# Patient Record
Sex: MALE | Race: WHITE
[De-identification: names, ages, dates, MRNs, and addresses within clinical notes are randomized per-mention and may not be internally consistent; named-entity substitution may affect disease eponyms.]

---

## 2017-08-30 ENCOUNTER — HOSPITAL ENCOUNTER (EMERGENCY)
Dept: HOSPITAL 74 - FER | Age: 22
Discharge: HOME | End: 2017-08-30
Payer: COMMERCIAL

## 2017-08-30 VITALS — TEMPERATURE: 98.5 F | HEART RATE: 76 BPM | DIASTOLIC BLOOD PRESSURE: 80 MMHG | SYSTOLIC BLOOD PRESSURE: 126 MMHG

## 2017-08-30 VITALS — BODY MASS INDEX: 29.8 KG/M2
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
## 2017-08-30 DIAGNOSIS — S63.287A: Primary | ICD-10-CM
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
## 2017-08-30 DIAGNOSIS — J45.909: ICD-10-CM

## 2017-08-30 PROCEDURE — 2W3KX1Z IMMOBILIZATION OF LEFT FINGER USING SPLINT: ICD-10-PCS

## 2017-08-30 NOTE — PDOC
History of Present Illness





- General


History Source: Patient


Exam Limitations: No Limitations





- History of Present Illness


Initial Comments: 


08/30/17 19:24


Patient is a 22 year old male with no pmhx who presents to the ED with left 

pinky finger injury. Patient states that he was playing basketball as another 

player his his left hand. He reports left pinky finger pain and difficulty 

bending the digit. 


He denies any head trauma, fall or LOC. 





PAST MEDICAL HISTORY:  no significant history


PAST SURGICAL HISTORY:  no significant history


FAMILY HISTORY:  no pertinent history


SOCIAL HISTORY:  Pt lives with  family and is employed.


MEDICATIONS:  reviewed


ALLERGIES:  As per nursing notes





General:  No fevers or chills, no weakness, no weight loss 


HEENT: No change in vision.  No sore throat, No ear pain


CardioVascular:  No chest pain or shortness of breath


Respiratory:No cough, or wheezing. 


Gastrointestinal:  no nausea, vomiting, diarrhea or constipation,  No rectal 

bleeding


Genitourinary:  No dysuria, hematuria, or frequency


Musculoskeletal:  +left pinky finger pain. No muscle pain or swelling


Neurologic: No headache, vertigo, dizziness or loss of consciousness


Psychiatric: nor depression 


Skin: No rashes or easy bruising


Endocrine: no increased thirst or abnormal weight change


Allergic: no skin or latex allergy


All other systems reviewed and normal





GENERAL: The patient is awake, alert, and fully 


oriented, in no acute distress.


HEAD: Normal with no signs of trauma.


EYES: Pupils equal, round and reactive to light, extraocular movements intact, 

sclera anicteric, conjunctiva clear.


EXTREMITIES:  (+)tenderness and swelling over the left pinky PIP joint with 

questionable deformity, patient is unable to flex the PIP joint secondary to 

pain and possible deformity, +Neurovascularly intact.  


NEUROLOGICAL: Normal speech, normal gait.


PSYCH: Normal mood, normal affect.


SKIN: Warm, Dry, normal turgor, no rashes or lesions noted.








<Analia Thomas - Last Filed: 08/30/17 19:24>





- General


History Source: Patient


Exam Limitations: No Limitations





- History of Present Illness


Initial Comments: 





08/30/17 20:03





A portion of this note was documented by scribe services under my direction. I 

have reviewed the details of the note, within reason, and agree with the 

documentation.  The case summary and management plan written by me. 





X-rays dislocation of PIP joint fifth finger





Procedure note: Manual reduction dislocated PIP joint fifth finger left hand


Finger was anesthetized via digital ring block using 1% lidocaine no 

epinephrine total cc approximately 4 mL


Finger was relocated without difficulty


Post reduction film shows good reduction of the dislocation





Assessment and plan: This is a 22-year-old male comes in with injury to his 

left fifth finger status post injuring it while playing a basketball game. 

Patient had a dislocation of the PIP joint that was reduced manually post 

digital ring block.





Splint was applied and patient was discharged. Patient was given referral to 

[facility] for follow-up.





<Roderick Shaw - Last Filed: 08/30/17 20:08>





- General


Chief Complaint: Injury


Stated Complaint: LEFT PINKY INJURY


Time Seen by Provider: 08/30/17 19:20





Past History





<Analia Thomas - Last Filed: 08/30/17 19:24>





- Past Medical History


Asthma: Yes





- Psycho/Social/Smoking Cessation Hx


Anxiety: No


Suicidal Ideation: No


Smoking History: Never smoked


Have you smoked in the past 12 months: No


Hx Alcohol Use: No


Drug/Substance Use Hx: No


Substance Use Type: None





<Roderick Shaw - Last Filed: 08/30/17 20:08>





- Past Medical History


Allergies/Adverse Reactions: 


 Allergies











Allergy/AdvReac Type Severity Reaction Status Date / Time


 


No Known Allergies Allergy   Verified 08/30/17 19:19











Home Medications: 


Ambulatory Orders





NK [No Known Home Medication]  08/30/17 











*DC/Admit/Observation/Transfer





- Attestations


Scribe Attestion: 





08/30/17 19:25





Documentation prepared by JORDAN Estes, acting as medical scribe for 

Roderick Shaw MD.





<Analia Thomas - Last Filed: 08/30/17 19:24>





- Discharge Dispostion


Admit: No





<Roderick Shaw - Last Filed: 08/30/17 20:08>


Diagnosis at time of Disposition: 


Dislocation of proximal interphalangeal joint of left little finger


Qualifiers:


 Encounter type: initial encounter Qualified Code(s): S63.287A - Dislocation of 

proximal interphalangeal joint of left little finger, initial encounter





- Discharge Dispostion


Disposition: HOME


Condition at time of disposition: Stable





- Patient Instructions


Additional Instructions: 


Tylenol or Motrin as needed for pain.





Wear the splint until you are seen by an orthopedist. Make sure you keep the 

splint dry if he get it wet taken off dry with a hairdryer and put it back on.





If you need an orthopedist call Dr. Casanova at [phone] in the morning for an 

appointment.





Return to the emergency department immediately with ANY new, persistent or 

worsening symptoms.





Continue any medications as previously prescribed by your physician.





You should follow up with your primary doctor as soon as possible regarding 

today's emergency department visit.


.


Please make sure your doctor reviews the results of your emergency evaluation.





Thank you for coming to the   Emergency Department today for your care. It was 

a pleasure to see you today. Please note that your evaluation is INCOMPLETE 

until you  follow-up with your doctor.

## 2018-05-30 ENCOUNTER — HOSPITAL ENCOUNTER (EMERGENCY)
Dept: HOSPITAL 74 - FER | Age: 23
Discharge: HOME | End: 2018-05-30
Payer: SELF-PAY

## 2018-05-30 VITALS — SYSTOLIC BLOOD PRESSURE: 125 MMHG | TEMPERATURE: 98.4 F | HEART RATE: 62 BPM | DIASTOLIC BLOOD PRESSURE: 76 MMHG

## 2018-05-30 VITALS — BODY MASS INDEX: 29.1 KG/M2
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
## 2018-05-30 DIAGNOSIS — S51.011A: Primary | ICD-10-CM
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
## 2018-05-30 PROCEDURE — 0HQDXZZ REPAIR RIGHT LOWER ARM SKIN, EXTERNAL APPROACH: ICD-10-PCS

## 2018-05-30 NOTE — PDOC
History of Present Illness





- General


Chief Complaint: Injury


Stated Complaint: HIT RIGHT ELBOW ON TOWEL RACK


Time Seen by Provider: 05/30/18 01:14


History Source: Patient


Exam Limitations: No Limitations





- History of Present Illness


Initial Comments: 





05/30/18 01:16


This is a 19-year-old male who comes in complaining of a laceration to his 

right elbow. Last immunization was 8 years ago. Patient is otherwise healthy.





PAST MEDICAL HISTORY:  no significant history





PAST SURGICAL HISTORY:  no significant history





FAMILY HISTORY:  no pertinant history





SOCIAL HISTORY:  Pt lives with  family and is employed.





MEDICATIONS:  reviewed





ALLERGIES:  As per nursing notes





Review of Systems


General:  No fevers or chills, no weakness, no weight loss 


HEENT: No change in vision.  No sore throat,. No ear pain


CardioVascular:  No chest pain or shortness of breath


Respiratory:No cough, or wheezing. 


Gastrointestinal:  no nausea, vomitting, diarrhea or constipation,  No rectal 

bleeding


Genitourinary:  No dysuria, hematuria, or frequency


Musculoskeletal:  No joint or muscle pain or swelling


Neurologic: No headache, vertigo, dizziness or loss of consciousness


Psychiatric: nor depression 


Skin: No rashes or easy bruising


Endocrine: no increased thirst or abnormal weight change


Allergic: no skin or latex allergy


All other systems reviewed and normal








GENERAL: The patient is awake, alert, and fully oriented, in no acute distress.


HEAD: Normal with no signs of trauma.


EYES: Pupils equal, round and reactive to light, extraocular movements intact, 

sclera anicteric, conjunctiva clear.


EXTREMITIES: There is approximately 3 mm superficial laceration over the right 

elbow posteriorly neurovascular is intact


NEUROLOGICAL: Normal speech, normal gait. grossly intact 


PSYCH: Normal mood, normal affect.


SKIN: Warm, Dry, normal turgor, no rashes or lesions noted.





Procedure note Dermabond repair of laceration


Laceration cleaned and then closed with Dermabond patient tolerated well 





Past History





- Past Medical History


Allergies/Adverse Reactions: 


 Allergies











Allergy/AdvReac Type Severity Reaction Status Date / Time


 


No Known Allergies Allergy   Verified 05/30/18 01:09











Home Medications: 


Ambulatory Orders





Albuterol Sulfate Inhaler - [Ventolin Hfa Inhaler -] 1 - 2 inh PO QID 05/30/18 








Asthma: Yes


COPD: No





- Suicide/Smoking/Psychosocial Hx


Smoking History: Never smoked


Have you smoked in the past 12 months: No


Information on smoking cessation initiated: No


Hx Alcohol Use: No


Drug/Substance Use Hx: No


Substance Use Type: None





*Physical Exam





- Vital Signs


 Last Vital Signs











Temp Pulse Resp BP Pulse Ox


 


 98.4 F   62   17   125/76   100 


 


 05/30/18 01:09  05/30/18 01:09 05/30/18 01:09 05/30/18 01:09 05/30/18 01:09














*DC/Admit/Observation/Transfer


Diagnosis at time of Disposition: 


Laceration of right elbow


Qualifiers:


 Encounter type: initial encounter Qualified Code(s): S51.011A - Laceration 

without foreign body of right elbow, initial encounter








- Discharge Dispostion


Disposition: HOME


Condition at time of disposition: Stable


Decision to Admit order: No





- Referrals





- Patient Instructions


Printed Discharge Instructions:  DI for Laceration Repair With Dermabond


Additional Instructions: 


Read over and follow the Dermabond instructions no stim 4 days you keep it dry 

for the next 72 hours and do not use any petroleum placed ointments on it as it 

will cause the bleeding to come off early





Return to the emergency department immediately with ANY new, persistent or 

worsening symptoms.





Continue any medications as previously prescribed by your physician.





You should follow up with your primary doctor as soon as possible regarding 

today's emergency department visit.


.


Please make sure your doctor reviews the results of your emergency evaluation.





Thank you for coming to the   Emergency Department today for your care. It was 

a pleasure to see you today. Please note that your evaluation is INCOMPLETE 

until you  follow-up with your doctor. 











- Post Discharge Activity

## 2018-06-11 ENCOUNTER — HOSPITAL ENCOUNTER (EMERGENCY)
Dept: HOSPITAL 74 - FER | Age: 23
LOS: 1 days | Discharge: HOME | End: 2018-06-12
Payer: SELF-PAY

## 2018-06-11 VITALS — TEMPERATURE: 98.1 F | DIASTOLIC BLOOD PRESSURE: 72 MMHG | HEART RATE: 80 BPM | SYSTOLIC BLOOD PRESSURE: 129 MMHG

## 2018-06-11 VITALS — BODY MASS INDEX: 29.9 KG/M2

## 2018-06-11 DIAGNOSIS — J45.20: Primary | ICD-10-CM

## 2018-06-11 PROCEDURE — 3E0F7GC INTRODUCTION OF OTHER THERAPEUTIC SUBSTANCE INTO RESPIRATORY TRACT, VIA NATURAL OR ARTIFICIAL OPENING: ICD-10-PCS

## 2018-06-11 NOTE — PDOC
History of Present Illness





- General


Chief Complaint: Asthma


Stated Complaint: ASTHMA


Time Seen by Provider: 06/11/18 23:38





- History of Present Illness


Initial Comments: 





This 22-year-old man with a history of asthma, diagnosed 2 years ago, presents 

with a few day history of upper respiratory symptoms and nonproductive cough 

and one-day history of persistent bronchospasm.  Patient states that he 

developed runny nose and sore throat followed by nonproductive cough 3 days 

ago.  Yesterday, he noted some wheezing which was responsive to albuterol 

inhaler.  Today, wheezing was persistent despite use of the inhaler and he took 

several Medrol 5 mg tablets from dose pack that was previously prescribed and 

not used.  He continued to have wheezing and presents to the emergency room.  

Patient states that in the 2 years since his asthma was diagnosed, he has 

needed outpatient steroid treatment a few times.  He has not needed to be 

hospitalized secondary to his asthma.  Patient does not currently have PMD (

health insurance coverage begins 7/1/18)


He currently does not have fever or productive cough.


He does not smoke








Past History





- Past Medical History


Allergies/Adverse Reactions: 


 Allergies











Allergy/AdvReac Type Severity Reaction Status Date / Time


 


No Known Allergies Allergy   Verified 06/11/18 23:41











Home Medications: 


Ambulatory Orders





Albuterol Sulfate Inhaler - [Ventolin Hfa Inhaler -] 1 - 2 inh PO QID 05/30/18 


Prednisone 20 mg PO DAILY #8 tablet 06/12/18 








Asthma: Yes


COPD: No





- Suicide/Smoking/Psychosocial Hx


Smoking History: Never smoked


Have you smoked in the past 12 months: No


Hx Alcohol Use: No


Drug/Substance Use Hx: No


Substance Use Type: None





**Review of Systems





- Review of Systems


Able to Perform ROS?: Yes


Comments:: 


12 point review of systems is negative except for what is noted in the history 

of present illness





*Physical Exam





- Physical Exam


Comments: 





GENERAL: Young adult male, alert and oriented 3, speaking in full sentences, 

in mild distress secondary to wheezing


HEAD: Normal with no signs of trauma.


EYES: PERRLA, EOMI, sclera anicteric, conjunctiva clear.


ENT: Ears normal, nares patent, oropharynx clear without exudates.  Dry mucous 

membranes.


NECK: Normal range of motion, supple without lymphadenopathy, JVD, or masses.


LUNGS: Bilateral expiratory wheezing


HEART:Regular rate and rhythm, normal S1 and S2 without murmur, rub or gallop.


ABDOMEN:.normal bowel sounds  No guarding,tenderness or rebound.No masses No 

distention. 


EXTREMITIES: Normal range of motion, no edema.  No clubbing or cyanosis. No 

erythema, or tenderness.


NEUROLOGICAL: Cranial nerves II through XII grossly intact.  Normal speech.  No 

focal 


neurological deficits. 


MUSCULOSKELETAL:  Back non-tender to palpation, no CVA tenderness


SKIN: Warm, Dry, normal turgor, no rashes or lesions noted.








Progress Note





- Progress Note


Progress Note: 





22-year-old man with a few year history of asthma presents with wheezing that 

had been resistant to albuterol inhaler therapy and several tablets of Medrol 5 

mg today.  Patient is speaking in full sentences and has pulse oximetry of 99% 

on room air.  However, he has bilateral expiratory wheezes on exam.





Today's episode appears to be secondary to a brief viral respiratory illness 

that began a few days ago.





Patient received DuoNeb nebulizer treatment.


Repeat examination shows clear lung fields with excellent air exchange.





Patient will be given prednisone 20 mg daily for the next 4 days (Medrol that 

he had taken home counts for the first day of 5 day course)


His albuterol inhaler is nearly full; no refill will be issued since patient 

states that his health insurance does not take effect until 2 weeks from now 

and he wishes to wait until he has coverage before he purchases new inhaler





Follow-up will be with [facility] practice in [place] the next 2 

weeks until patient has health insurance coverage and he will pick a PMD for 

follow-up.  In while, he can come to the emergency room at any time if he has 

persistent severe bronchospasm, shortness of breath, severe cough or high fever





*DC/Admit/Observation/Transfer


Diagnosis at time of Disposition: 


Asthmatic bronchitis


Qualifiers:


 Asthma severity: mild Asthma persistence: intermittent Asthma complication type

: uncomplicated Qualified Code(s): J45.20 - Mild intermittent asthma, 

uncomplicated








- Discharge Dispostion


Disposition: HOME


Condition at time of disposition: Stable





- Prescriptions


Prescriptions: 


Prednisone 20 mg PO DAILY #8 tablet





- Referrals


Referrals: 


[facility] [Provider Group]





- Patient Instructions


Printed Discharge Instructions:  Asthma -- Adult


Additional Instructions: 


Prednisone 20 mg daily for 4 more days; take with food


Albuterol inhaler as needed up to 4 times a day


Return to ER if you have persistent wheezing/shortness of breath/severe cough


Follow-up within the next week to [facility] as discussed





- Post Discharge Activity

## 2022-12-06 ENCOUNTER — APPOINTMENT (OUTPATIENT)
Dept: PAIN MANAGEMENT | Facility: CLINIC | Age: 27
End: 2022-12-06

## 2022-12-06 PROBLEM — Z00.00 ENCOUNTER FOR PREVENTIVE HEALTH EXAMINATION: Status: ACTIVE | Noted: 2022-12-06
